# Patient Record
Sex: FEMALE | Race: OTHER
[De-identification: names, ages, dates, MRNs, and addresses within clinical notes are randomized per-mention and may not be internally consistent; named-entity substitution may affect disease eponyms.]

---

## 2019-11-08 ENCOUNTER — HOSPITAL ENCOUNTER (EMERGENCY)
Dept: HOSPITAL 56 - MW.ED | Age: 34
Discharge: HOME | End: 2019-11-08
Payer: COMMERCIAL
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
## 2019-11-08 DIAGNOSIS — Z88.2: ICD-10-CM
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
## 2019-11-08 DIAGNOSIS — S92.414A: Primary | ICD-10-CM

## 2019-11-08 PROCEDURE — 73630 X-RAY EXAM OF FOOT: CPT

## 2019-11-08 PROCEDURE — 99283 EMERGENCY DEPT VISIT LOW MDM: CPT

## 2019-11-08 NOTE — EDM.PDOC
ED HPI GENERAL MEDICAL PROBLEM





- General


Chief Complaint: Lower Extremity Injury/Pain


Stated Complaint: TOE PAIN ON RIGHT FOOT


Time Seen by Provider: 11/08/19 20:39





- History of Present Illness


INITIAL COMMENTS - FREE TEXT/NARRATIVE: 





HISTORY AND PHYSICAL:





History of present illness:


The patient is a healthy 34-year-old female who presents with complaints of 

pain at the right great toe after she fell down about 8 stairs and says that 

she bent her foot backwards. She did not pass out or black out and has no head 

neck or back pain and has no nausea or vomiting. She complains only of pain at 

the base of the great toe on the right foot and the other toes are without pain 

as is the remainder of the foot ankle leg knee and hip. Prior to these events 

she was in her usual state of good health with no systemic issues. Patient says 

she has a history of a hysterectomy and is not pregnant. She did not take any 

medications prior to coming here for the pain. The patient says that she thinks 

that she hit the left side of her face going down the stairs as there are some 

red marks there but she has no pain there





Review of systems: 


As per history of present illness and below otherwise all systems reviewed and 

negative.





Past medical history: 


As per history of present illness and as reviewed below otherwise 

noncontributory.





Surgical history: 


As per history of present illness and as reviewed below otherwise 

noncontributory.





Social history: 


No reported history of drug or alcohol abuse.





Family history: 


As per history of present illness and as reviewed below otherwise 

noncontributory.





Physical exam:


General: Well-developed well-nourished female who is nontoxic and vital signs 

are noted by me


HEENT: Atraumatic, normocephalic, there are some superficial red marks on the 

left side of the patient's face but there is no soft tissue swelling or 

palpable bony defects or deformities or tenderness of the facial bony 

architecture, negative for conjunctival pallor or scleral icterus, mucous 

membranes moist, throat clear, neck supple, nontender, trachea midline.


Lungs: Clear to auscultation, breath sounds equal bilaterally, chest nontender.


Heart: S1S2, regular rate and rhythm no overt murmurs


Abdomen: Soft, nondistended, nontender. NABS


Pelvis: Stable nontender. No lateral hip tenderness on the right


Genitourinary: Deferred.


Rectal: Deferred.


Extremities: Atraumatic, and full range of motion of all extremities with the 

exception of the right foot near the great toe. There is some minimal soft 

tissue swelling seen at the great toe and at the M TP area with exquisite 

tenderness on palpation but no ecchymosis crepitus or bony deformities nor is 

there any malalignment appreciated. The remainder of the toes are nontender as 

is the proximal metatarsals cuboids talus ankle leg knee and thigh.. 

Neurovascular unremarkable.


Neuro: Awake, alert, oriented. Cranial nerves II through XII unremarkable. 

Cerebellum unremarkable. Motor and sensory unremarkable throughout. Exam 

nonfocal.





Diagnostics:


X-ray right foot attention great toe





Therapeutics:


Motrin, ice pack, Buddy tape toes, ortho boot and crutches





Impression: 


Fracture of right great toe, proximal phalanx





Definitive disposition and diagnosis as appropriate pending reevaluation and 

review of above.


  ** Right Toe-Hailux


Pain Score (Numeric/FACES): 8





- Related Data


 Allergies











Allergy/AdvReac Type Severity Reaction Status Date / Time


 


Sulfa (Sulfonamide Allergy  Anaphylactic Verified 11/08/19 20:43





Antibiotics)   Shock  











Home Meds: 


 Home Meds





Trospium [Sanctura] 20 mg PO ACBRK 11/08/19 [History]











Review of Systems





- Review of Systems


Review Of Systems: ROS reveals no pertinent complaints other than HPI.





ED EXAM, GENERAL





- Physical Exam


Exam: See Below (See dictation)





Course





- Vital Signs


Last Recorded V/S: 


 Last Vital Signs











Temp  36.7 C   11/08/19 20:44


 


Pulse  78   11/08/19 21:09


 


Resp  16   11/08/19 21:09


 


BP  124/82   11/08/19 21:09


 


Pulse Ox  99   11/08/19 21:09














- Orders/Labs/Meds


Orders: 


 Active Orders 24 hr











 Category Date Time Status


 


 Foot Comp Min 3V Rt [CR] Stat Exams  11/08/19 20:50 Taken


 


 DME for Discharge [COMM] Stat Oth  11/08/19 21:16 Ordered











Meds: 


Medications














Discontinued Medications














Generic Name Dose Route Start Last Admin





  Trade Name Freq  PRN Reason Stop Dose Admin


 


Ibuprofen  600 mg  11/08/19 20:50  11/08/19 21:08





  Motrin  PO  11/08/19 20:51  600 mg





  ONETIME ONE   Administration





     





     





     





     














Departure





- Departure


Time of Disposition: 21:19


Disposition: Home, Self-Care 01


Condition: Good


Clinical Impression: 


Fracture of great toe of right foot


Qualifiers:


 Encounter type: initial encounter Fracture type: closed Phalanx: proximal 

Fracture alignment: nondisplaced Qualified Code(s): S92.414A - Nondisplaced 

fracture of proximal phalanx of right great toe, initial encounter for closed 

fracture








- Discharge Information


Referrals: 


Ken Meier MD [Primary Care Provider] - 


Forms:  ED Department Discharge


Additional Instructions: 


The following information is given to patients seen in the emergency department 

who are being discharged to home. This information is to outline your options 

for follow-up care. We provide all patients seen in our emergency department 

with a follow-up referral.





The need for follow-up, as well as the timing and circumstances, are variable 

depending upon the specifics of your emergency department visit.





If you don't have a primary care physician on staff, we will provide you with a 

referral. We always advise you to contact your personal physician following an 

emergency department visit to inform them of the circumstance of the visit and 

for follow-up with them and/or the need for any referrals to a consulting 

specialist.





The emergency department will also refer you to a specialist when appropriate. 

This referral assures that you have the opportunity for followup care with a 

specialist. All of these measure are taken in an effort to provide you with 

optimal care, which includes your followup.





Under all circumstances we always encourage you to contact your private 

physician who remains a resource for coordinating  your care. When calling for 

followup care, please make the office aware that this follow-up is from your 

recent emergency room visit. If for any reason you are refused follow-up, 

please contact the [facility] emergency 

department at [phone] and ask to speak to the emergency department 

charge nurse.





Dr MARILU Silva


[address] [place] [address]  [id number]


Phone:  [phone]


Fax:  [phone]








Ice and elevate the area and use over-the-counter Tylenol and ibuprofen as 

needed for pain management. You may also use a stronger pain medication you 

have been prescribed as needed for sleep and when you were at home. Do not 

weight-bear and use crutches and ortho boot at all times loosening or removing 

the ortho boot at sleep times. Please connect with our local podiatrist with 

resources given to above for further care and evaluation. Return to ER as 

needed and as discussed





- My Orders


Last 24 Hours: 


My Active Orders





11/08/19 20:50


Foot Comp Min 3V Rt [CR] Stat 





11/08/19 21:16


DME for Discharge [COMM] Stat 














- Assessment/Plan


Last 24 Hours: 


My Active Orders





11/08/19 20:50


Foot Comp Min 3V Rt [CR] Stat 





11/08/19 21:16


DME for Discharge [COMM] Stat

## 2019-11-08 NOTE — CR
INDICATION: Fall, great toe injury



TECHNIQUE: Foot radiograph 3 views right



COMPARISON: None



FINDINGS: 



Bone: There is nondisplaced oblique fracture present involving both the 

proximal and distal articular surfaces of the 1st proximal phalanx. 



Joint: The visualized hindfoot, midfoot, and forefoot joints are 

unremarkable in appearance. No significant ankle effusion is seen. 



Soft tissue: Unremarkable. No radiopaque foreign bodies are seen. 



IMPRESSION: 



1. There is nondisplaced oblique fracture present involving both the 

proximal and distal articular surfaces of the 1st proximal phalanx.



Dictated by Nhan Back MD @ 11/8/2019 9:18:16 PM



Dictated by: Nhan Back MD @ 11/08/2019 21:18:24



(Electronically Signed)

## 2019-11-20 LAB
BUN SERPL-MCNC: 13 MG/DL (ref 7–18)
CHLORIDE SERPL-SCNC: 105 MMOL/L (ref 98–107)
CO2 SERPL-SCNC: 28.7 MMOL/L (ref 21–32)
GLUCOSE SERPL-MCNC: 76 MG/DL (ref 74–106)
POTASSIUM SERPL-SCNC: 4.1 MMOL/L (ref 3.5–5.1)
SODIUM SERPL-SCNC: 140 MMOL/L (ref 136–145)

## 2019-11-21 ENCOUNTER — HOSPITAL ENCOUNTER (OUTPATIENT)
Dept: HOSPITAL 56 - MW.SDS | Age: 34
LOS: 1 days | Discharge: HOME | End: 2019-11-22
Attending: OBSTETRICS & GYNECOLOGY
Payer: COMMERCIAL

## 2019-11-21 DIAGNOSIS — D25.9: Primary | ICD-10-CM

## 2019-11-21 DIAGNOSIS — Z88.2: ICD-10-CM

## 2019-11-21 DIAGNOSIS — Z79.899: ICD-10-CM

## 2019-11-21 PROCEDURE — 86901 BLOOD TYPING SEROLOGIC RH(D): CPT

## 2019-11-21 PROCEDURE — 80048 BASIC METABOLIC PNL TOTAL CA: CPT

## 2019-11-21 PROCEDURE — 86850 RBC ANTIBODY SCREEN: CPT

## 2019-11-21 PROCEDURE — 36415 COLL VENOUS BLD VENIPUNCTURE: CPT

## 2019-11-21 PROCEDURE — 85025 COMPLETE CBC W/AUTO DIFF WBC: CPT

## 2019-11-21 PROCEDURE — 84703 CHORIONIC GONADOTROPIN ASSAY: CPT

## 2019-11-21 PROCEDURE — 85027 COMPLETE CBC AUTOMATED: CPT

## 2019-11-21 PROCEDURE — 86900 BLOOD TYPING SEROLOGIC ABO: CPT

## 2019-11-21 PROCEDURE — 58571 TLH W/T/O 250 G OR LESS: CPT

## 2019-11-21 RX ADMIN — KETOROLAC TROMETHAMINE PRN MG: 30 INJECTION, SOLUTION INTRAMUSCULAR at 22:08

## 2019-11-21 RX ADMIN — KETOROLAC TROMETHAMINE PRN MG: 30 INJECTION, SOLUTION INTRAMUSCULAR at 15:47

## 2019-11-21 NOTE — OR
SURGEON:

Herb Norton MD

 

DATE OF PROCEDURE:

 

PREOPERATIVE DIAGNOSES:

Menometrorrhagia and pelvic pain.

 

POSTOPERATIVE DIAGNOSES:

Menometrorrhagia and pelvic pain.

 

OPERATIONS PERFORMED:

Total laparoscopic hysterectomy, laparoscopic bilateral salpingectomy preserving

both ovaries, and cystoscopy.

 

PRIMARY SURGEON:

Herb Norton MD.

 

ASSISTANT:

OR tech.

 

ANESTHESIA:

General endotracheal intubation, Suzy Sierra and Dr. Martinez.

 

ESTIMATED BLOOD LOSS:

100 mL.

 

COMPLICATIONS:

None.

 

FINDINGS:

Uterus is about 10- to 11-week size.  Both ovaries essentially are normal.

 

INDICATIONS FOR SURGERY:

The reader referred to the admit note.

 

PROCEDURE IN DETAIL:

The patient was brought to the OR, properly identified.  After adequate level of

anesthesia, the patient was placed in lithotomy position, prepped and draped in

sterile fashion as usual.  Weighted speculum was placed in vagina and Randolph

catheter placed in the bladder for drainage and 3.5 size manipulator placed in

the uterus for manipulation and then the operation shifted abdominally.  Stab

wound done beneath the umbilicus.  The Veress needle was placed in the

peritoneal cavity and that cavity insufflated with 6 L of carbon dioxide, and

then the utilizing the Visiport technique, the 5 mm trocar placed

infraumbilically under direct vision.  A 10/12 trocar placed in the left iliac

fossa and 5 mm trocar in the right iliac fossa.  There were a few adhesions with

the anterior abdominal wall from her previous  section and that was

taken down by the Harmonic scalpel without any problem.  Then, the operation

started by identifying the landmark of the pelvis and then the superior pedicle

was coagulated and transected using the Ace Harmonic scalpel.  The tubes

included with the specimen.  Both ovaries preserved.  Anteriorly, the round

ligament was coagulated, done in the same way, and then the anterior leaf of the

broad ligament dissected downward medially detaching and  the bladder

completely from the operative field until the surgeon could feel the manipulator

through the vaginal wall clearly.  Then, the uterine vessel at the level of the

manipulator coagulated and transected with the Harmonic scalpel.  Next, using

the Harmonic scalpel, a circular incision in the vaginal mucosa was done

detaching the cervix from its attachment to the vagina.  The uterus, cervix,

both the ovaries were removed vaginally.  Pneumoperitoneum re-established by

placing vaginal pack in the vagina and then thorough irrigation of the pelvis

was done.  The inspection of all the pedicles was done and there were a few

areas of oozing from the vaginal wall and the side of the vagina and this picked

individually and the bleeding stopped by judicious use of the bipolar

electrocautery.  Next step, the vaginal cuff was closed laparoscopically using 2-

0 PDS interrupted sutures.  While we were doing that, we asked Anesthesia to

give the patient fluorescein and then cystoscopy was performed.  The bladder was

intact and the left ureter was visible with the dye coming from it.  The right

side, the patient is known to have a nonfunctioning right kidney, so there was

no dye visualized on the right side.  Satisfied with these findings, the

procedure ended.  The instrument and sponge count was correct.  After closing

the laparoscopic incision layer, the instrument count was correct.  The patient

tolerated the procedure well, went to recovery room in stable general condition.

 

 

SADI ELLIS

DD:  2019 11:03:33

DT:  2019 13:17:09

Job #:  [id number]

## 2019-11-21 NOTE — [id number]
Post Anesthesia Note





- EVALUATION WITHIN 48HRS OF ANESTHETIC


Vital Signs in Normal Range: Yes


Patient Participated in Evaluation: Yes


Respiratory Function Stable: Yes


Airway Patent: Yes


Cardiovascular Function Stable: Yes


Hydration Status Stable: Yes


Pain Control Satisfactory: Yes


Nausea and Vomiting Control Satisfactory: Yes


Mental Status Recovered: Yes


Vital Signs: 


 Last Vital Signs











Temp  36.0 C   11/21/19 11:00


 


Pulse  67   11/21/19 12:30


 


Resp  12   11/21/19 12:30


 


BP  111/71   11/21/19 12:30


 


Pulse Ox  94 L  11/21/19 12:30

## 2019-11-21 NOTE — PCM.PREANE
Preanesthetic Assessment





- Anesthesia/Transfusion/Family Hx


Anesthesia History: Prior Anesthesia Reaction


Other Type of Anesthesia Reaction Comment: only with first - was 

"treated" after that


Family History of Anesthesia Reaction: No


Transfusion History: No Prior Transfusion(s)


Intubation History: Unknown





- Review of Systems


General: No Symptoms


Pulmonary: No Symptoms


Cardiovascular: No Symptoms


Gastrointestinal: No Symptoms


Neurological: No Symptoms


Other: Reports: None





- Physical Assessment


Vital Signs: 





 Last Vital Signs











Temp  36.4 C   19 07:04


 


Pulse  77   19 07:04


 


Resp  14   19 07:04


 


BP  105/68   19 07:04


 


Pulse Ox  98   19 07:04











Height: 5 ft 2 in


Weight: 71.668 kg


ASA Class: 2


Mental Status: Alert & Oriented x3


Airway Class: Mallampati = 1


Dentition: Reports: Normal Dentition (braces upper and lower), Crown(s) (x1 

upper front)


Thyro-Mental Finger Breadths: 3


Mouth Opening Finger Breadths: 3


ROM/Head Extension: Full


Lungs: Clear to Auscultation, Normal Respiratory Effort


Cardiovascular: Regular Rate, Regular Rhythm





- Lab


Values: 





 Laboratory Last Values











WBC  5.46 K/uL (4.0-11.0)   19  09:40    


 


RBC  4.18 M/uL (4.30-5.90)  L  19  09:40    


 


Hgb  12.4 g/dL (12.0-16.0)   19  09:40    


 


Hct  36.9 % (36.0-46.0)   19  09:40    


 


MCV  88.3 fL (80.0-98.0)   19  09:40    


 


MCH  29.7 pg (27.0-32.0)   19  09:40    


 


MCHC  33.6 g/dL (31.0-37.0)   19  09:40    


 


RDW Std Deviation  40.2 fl (28.0-62.0)   19  09:40    


 


RDW Coeff of Samantha  13 % (11.0-15.0)   19  09:40    


 


Plt Count  290 K/uL (150-400)   19  09:40    


 


MPV  9.50 fL (7.40-12.00)   19  09:40    


 


Nucleated RBC %  0.0 /100WBC  19  09:40    


 


Nucleated RBCs #  0 K/uL  19  09:40    


 


Sodium  140 mmol/L (136-145)   19  09:40    


 


Potassium  4.1 mmol/L (3.5-5.1)   19  09:40    


 


Chloride  105 mmol/L ()   19  09:40    


 


Carbon Dioxide  28.7 mmol/L (21.0-32.0)   19  09:40    


 


BUN  13 mg/dL (7.0-18.0)   19  09:40    


 


Creatinine  0.6 mg/dL (0.6-1.0)   19  09:40    


 


Est Cr Clr Drug Dosing  105.48 mL/min  19  09:40    


 


Estimated GFR (MDRD)  > 60.0 ml/min  19  09:40    


 


Glucose  76 mg/dL ()   19  09:40    


 


Calcium  8.4 mg/dL (8.5-10.1)  L  19  09:40    


 


HCG, Qual  NEGATIVE  (NEG)   19  09:40    


 


Blood Type  O POSITIVE   19  09:40    


 


Antibody Screen  NEGATIVE   19  09:40    














- Allergies


Allergies/Adverse Reactions: 


 Allergies











Allergy/AdvReac Type Severity Reaction Status Date / Time


 


Sulfa (Sulfonamide Allergy  Anaphylactic Verified 19 07:10





Antibiotics)   Shock  














- Blood


Blood Available: No





- Anesthesia Plan


Pre-Op Medication Ordered: None





- Acknowledgements


Anesthesia Type Planned: General Anesthesia


Pt an Appropriate Candidate for the Planned Anesthesia: Yes


Alternatives and Risks of Anesthesia Discussed w Pt/Guardian: Yes


Pt/Guardian Understands and Agrees with Anesthesia Plan: Yes





PreAnesthesia Questionnaire


HEENT History: Reports: Other (See Below)


Other HEENT History: has upper and lower dental braces


Genitourinary History: Reports: Other (See Below)


Other Genitourinary History: has only 1 kidney- congenital


OB/GYN History: Reports: Pregnancy, Other (See Below) (cystocele)


Musculoskeletal History: Reports: Fracture


Other Musculoskeletal History: currently has fx right toe and right sprained 

ankle- is wearing a "boot"


Neurological History: Reports: Other (See Below)


Other Neuro History: hx of motion sickness


Psychiatric History: Reports: Anxiety


Other Psychiatric History: states "mild" anxiety- no medication


Hematologic History: Reports: Anemia





- Infectious Disease History


Infectious Disease History: Reports: None





- Past Surgical History


Head Surgeries/Procedures: Reports: None


HEENT Surgical History: Reports: Tonsillectomy


GI Surgical History: Reports: Bariatric Procedure


Female  Surgical History: Reports:  Section, LEEP


Other Female  Surgeries/Procedures:  x3





- SUBSTANCE USE


Smoking Status *Q: Never Smoker


Recreational Drug Use History: No





- HOME MEDS


Home Medications: 


 Home Meds





Calcium Carb, Citrate/Vit D3 [Citracal + D ER] 1 tab PO DAILY 19 [History]


Desogestrel-Ethinyl Estradiol [Juleber 28 Day Tablet] 1 tab PO DAILY 19 [

History]


Iron 65 mg PO BID 19 [History]


Multivit/Iron/Folic Acid/Hb179 [Womens Multivit Hi Potency Tab] 1 tab PO DAILY 

19 [History]











- CURRENT (IN HOUSE) MEDS


Current Meds: 





 Current Medications





Lactated Ringer's (Ringers, Lactated)  1,000 mls @ 125 mls/hr IV ASDIRECTED SHAWNEE


   Last Admin: 19 07:21 Dose:  125 mls/hr


Sodium Chloride (Saline Flush)  10 ml FLUSH ASDIRECTED PRN


   PRN Reason: Keep Vein Open


Sodium Chloride (Saline Flush)  2.5 ml FLUSH ASDIRECTED PRN


   PRN Reason: Keep Vein Open


Sodium Chloride (Normal Saline)  10 ml IV ASDIRECTED PRN


   PRN Reason: IV Use





Discontinued Medications





Cefazolin Sodium/Dextrose (Ancef) Confirm Administered Dose 2 gm IV .STK-MED ONE


   Stop: 19 07:24


Fentanyl (Sublimaze) Confirm Administered Dose 100 mcg .ROUTE .STK-MED ONE


   Stop: 19 07:20


Cefazolin Sodium/Dextrose 2 gm (/ Premix)  50 mls @ 100 mls/hr IV ONETIME ONE


   Stop: 19 09:28


Lidocaine (Xylocaine-Mpf 2%) Confirm Administered Dose 5 ml .ROUTE .STK-MED ONE


   Stop: 19 07:20


Midazolam HCl (Versed 1 Mg/Ml) Confirm Administered Dose 2 mg .ROUTE .STK-MED 

ONE


   Stop: 19 07:20


Propofol (Diprivan  20 Ml) Confirm Administered Dose 200 mg .ROUTE .STK-MED ONE


   Stop: 19 07:20


Rocuronium Bromide (Zemuron) Confirm Administered Dose 100 mg .ROUTE .STK-MED 

ONE


   Stop: 19 07:21

## 2019-11-21 NOTE — PCM.DCSUM1
**Discharge Summary





- Hospital Course


Diagnosis: Stroke: No





- Discharge Data


Discharge Date: 11/21/19


Discharge Disposition: Home, Self-Care 01


Condition: Good





- Referral to Home Health


Primary Care Physician: 


Ken Meier MD








- Patient Summary/Data


Operative Procedure(s) Performed: rama ALVAREZ sa.  engectomy and Cystoscopy.





- Discharge Plan


Home Medications: 


 Home Meds





Calcium Carb, Citrate/Vit D3 [Citracal + D ER] 1 tab PO DAILY 11/18/19 [History]


Desogestrel-Ethinyl Estradiol [Juleber 28 Day Tablet] 1 tab PO DAILY 11/18/19 [

History]


Iron 65 mg PO BID 11/18/19 [History]


Multivit/Iron/Folic Acid/Hb179 [Womens Multivit Hi Potency Tab] 1 tab PO DAILY 

11/18/19 [History]











- Discharge Summary/Plan Comment


DC Time >30 min.: Yes





- General Info


Date of Service: 11/21/19


Functional Status: Reports: Pain Controlled





- Review of Systems


General: Reports: No Symptoms


HEENT: Reports: No Symptoms


Pulmonary: Reports: No Symptoms


Cardiovascular: Reports: No Symptoms


Gastrointestinal: Reports: No Symptoms


Genitourinary: Reports: No Symptoms


Musculoskeletal: Reports: No Symptoms


Skin: Reports: No Symptoms


Neurological: Reports: No Symptoms


Psychiatric: Reports: No Symptoms





- Patient Data


Vitals - Most Recent: 


 Last Vital Signs











Temp  36 C   11/21/19 10:22


 


Pulse  83   11/21/19 10:47


 


Resp  10 L  11/21/19 10:47


 


BP  114/78   11/21/19 10:47


 


Pulse Ox  99   11/21/19 10:47











Weight - Most Recent: 71.668 kg


I&O - Last 24 hours: 


 Intake & Output











 11/20/19 11/21/19 11/21/19





 22:59 06:59 14:59


 


Output Total   400


 


Balance   -400











Med Orders - Current: 


 Current Medications





Belladonna Alkaloids/Opium (B & O Supprettes No. 15a)  1 supp RECTAL ONETIME PRN


   PRN Reason: Pain


Fentanyl (Sublimaze)  50 - 100 mcg IVPUSH Q5M PRN


   PRN Reason: Pain


   Stop: 11/21/19 11:47


   Last Admin: 11/21/19 10:45 Dose:  50 mcg


Lactated Ringer's (Ringers, Lactated)  1,000 mls @ 125 mls/hr IV ASDIRECTED [place]


   Last Admin: 11/21/19 07:21 Dose:  125 mls/hr


Ketorolac Tromethamine (Toradol)  30 mg IVPUSH Q6H PRN


   PRN Reason: Pain (severe 7-10)


   Stop: 11/26/19 10:10


Meperidine HCl (Demerol)  12.5 - 25 mg IV ONETIME PRN


   PRN Reason: Shivering


   Stop: 11/21/19 11:46


Morphine Sulfate (Morphine)  4 mg IVPUSH Q2H PRN


   PRN Reason: Pain (severe 7-10)


Ondansetron HCl (Zofran)  4 mg IVPUSH Q6H PRN


   PRN Reason: Nausea/Vomiting


Oxycodone/Acetaminophen (Percocet 325-5 Mg)  1 tab PO Q4H PRN


   PRN Reason: Pain (moderate 4-6)


Oxycodone/Acetaminophen (Percocet 325-5 Mg)  2 tab PO Q4H PRN


   PRN Reason: Pain (moderate 4-6)


Promethazine HCl (Phenergan)  12.5 mg IM ONETIME PRN


   PRN Reason: Nausea


Promethazine HCl (Phenergan)  25 mg IM Q6H PRN


   PRN Reason: Nausea/Vomiting


Scopolamine (Transderm-Scop)  1.5 mg TRDERM Q72H PRN


   PRN Reason: Nausea


   Last Admin: 11/21/19 07:37 Dose:  1.5 mg


Sodium Chloride (Saline Flush)  10 ml FLUSH ASDIRECTED PRN


   PRN Reason: Keep Vein Open


Sodium Chloride (Saline Flush)  2.5 ml FLUSH ASDIRECTED PRN


   PRN Reason: Keep Vein Open


Sodium Chloride (Normal Saline)  10 ml IV ASDIRECTED PRN


   PRN Reason: IV Use





Discontinued Medications





Cefazolin Sodium/Dextrose (Ancef) Confirm Administered Dose 2 gm IV .STK-MED ONE


   Stop: 11/21/19 07:24


Dexamethasone (Dexamethasone) Confirm Administered Dose 20 mg .ROUTE .STK-MED 

ONE


   Stop: 11/21/19 08:23


Fentanyl (Sublimaze) Confirm Administered Dose 100 mcg .ROUTE .STK-MED ONE


   Stop: 11/21/19 07:20


Fluorescein Sodium (Ak-Fluor) Confirm Administered Dose 5 ml .ROUTE .STK-MED ONE


   Stop: 11/21/19 07:33


Glycopyrrolate (Robinul) Confirm Administered Dose 0.4 mg .ROUTE .STK-MED ONE


   Stop: 11/21/19 09:11


Hydromorphone HCl (Dilaudid) Confirm Administered Dose 2 mg .ROUTE .STK-MED ONE


   Stop: 11/21/19 08:26


Cefazolin Sodium/Dextrose 2 gm (/ Premix)  50 mls @ 100 mls/hr IV ONETIME ONE


   Stop: 11/20/19 09:28


Ketorolac Tromethamine (Toradol) Confirm Administered Dose 30 mg .ROUTE .STK-

MED ONE


   Stop: 11/21/19 10:04


Ketorolac Tromethamine (Toradol)  30 mg IVPUSH ONETIME ONE


   Stop: 11/21/19 10:11


Lidocaine (Xylocaine-Mpf 2%) Confirm Administered Dose 5 ml .ROUTE .STK-MED ONE


   Stop: 11/21/19 07:20


Metoclopramide HCl (Reglan) Confirm Administered Dose 10 mg .ROUTE .STK-MED ONE


   Stop: 11/21/19 08:23


Midazolam HCl (Versed 1 Mg/Ml) Confirm Administered Dose 2 mg .ROUTE .STK-MED 

ONE


   Stop: 11/21/19 07:20


Neostigmine Methylsulfate (Neostigmine) Confirm Administered Dose 5 mg .ROUTE 

.STK-MED ONE


   Stop: 11/21/19 09:11


Octyl Cyanoacrylate (Dermabond Advance) Confirm Administered Dose 1 applic 

.ROUTE .STK-MED ONE


   Stop: 11/21/19 07:33


Ondansetron HCl (Zofran) Confirm Administered Dose 4 mg .ROUTE .STK-MED ONE


   Stop: 11/21/19 08:23


Propofol (Diprivan  20 Ml) Confirm Administered Dose 200 mg .ROUTE .STK-MED ONE


   Stop: 11/21/19 07:20


Rocuronium Bromide (Zemuron) Confirm Administered Dose 100 mg .ROUTE .STK-MED 

ONE


   Stop: 11/21/19 07:21


Scopolamine (Transderm-Scop) Confirm Administered Dose 1.5 mg .ROUTE .STK-MED 

ONE


   Stop: 11/21/19 07:34











- Exam


General: Reports: Alert, Oriented


HEENT: Reports: Pupils Equal, Pupils Reactive, EOMI, Mucous Membr. Moist/Pink


Neck: Reports: Supple


Lungs: Reports: Clear to Auscultation, Normal Respiratory Effort


Cardiovascular: Reports: Regular Rate, Regular Rhythm


GI/Abdominal Exam: Normal Bowel Sounds, Soft, Non-Tender, No Organomegaly, No 

Distention, No Abnormal Bruit, No Mass, Pelvis Stable


 (Female) Exam: Normal External Exam, Normal Speculum Exam, Normal Bimanual 

Exam


Rectal (Female) Exam: Normal Exam, Normal Rectal Tone


Back Exam: Reports: Normal Inspection, Full Range of Motion


Extremities: Normal Inspection, Normal Range of Motion, Non-Tender, No Pedal 

Edema, Normal Capillary Refill


Skin: Reports: Warm, Dry, Intact


Wound/Incisions: Reports: Healing Well


Neurological: Reports: No New Focal Deficit


Psy/Mental Status: Reports: Alert, Normal Affect, Normal Mood

## 2019-11-21 NOTE — PCM.OPNOTE
- General Post-Op/Procedure Note


Date of Surgery/Procedure: 11/21/19


Operative Procedure(s): TLH,blacharles sa.  engectomy and Cystoscopy.


Pre Op Diagnosis: bleeding


Post-Op Diagnosis: Same


Anesthesia Technique: General ET Tube


Primary Surgeon: Herb Norton


EBL in mLs: 100


Complications: None


Condition: Good

## 2019-11-21 NOTE — PCM.POSTAN
POST ANESTHESIA ASSESSMENT





- MENTAL STATUS


Mental Status: Alert, Oriented





- VITAL SIGNS


Vital Signs: 


 Last Vital Signs











Temp  36 C   11/21/19 10:22


 


Pulse  83   11/21/19 10:47


 


Resp  10 L  11/21/19 10:47


 


BP  114/78   11/21/19 10:47


 


Pulse Ox  99   11/21/19 10:47














- RESPIRATORY


Respiratory Status: Respiratory Rate WNL, Airway Patent, O2 Saturation Stable





- CARDIOVASCULAR


CV Status: Pulse Rate WNL, Blood Pressure Stable





- GASTROINTESTINAL


GI Status: No Symptoms





- PAIN


Pain Score: 3





- POST OP HYDRATION


Hydration Status: Adequate & Stable

## 2019-11-21 NOTE — PCM.PNPP
- General Info


Date of Service: 19


Functional Status: Reports: Pain Controlled





- Review of Systems


General: Reports: No Symptoms


HEENT: Reports: No Symptoms


Pulmonary: Reports: No Symptoms


Cardiovascular: Reports: No Symptoms


Gastrointestinal: Reports: No Symptoms


Genitourinary: Reports: No Symptoms


Musculoskeletal: Reports: No Symptoms


Skin: Reports: No Symptoms


Neurological: Reports: No Symptoms


Psychiatric: Reports: No Symptoms





- General Info


Date of Service: 19





- Patient Data


Vital Signs - Most Recent: 


 Last Vital Signs











Temp  36 C   19 10:22


 


Pulse  89   19 10:37


 


Resp  10 L  19 10:37


 


BP  117/61   19 10:37


 


Pulse Ox  100   19 10:37











Weight - Most Recent: 71.668 kg


I&O - Last 24 Hours: 


 Intake & Output











 19





 22:59 06:59 14:59


 


Output Total   400


 


Balance   -400











Med Orders - Current: 


 Current Medications





Belladonna Alkaloids/Opium (B & O Supprettes No. 15a)  1 supp RECTAL ONETIME PRN


   PRN Reason: Pain


Fentanyl (Sublimaze)  50 - 100 mcg IVPUSH Q5M PRN


   PRN Reason: Pain


   Stop: 19 11:47


   Last Admin: 19 10:45 Dose:  50 mcg


Lactated Ringer's (Ringers, Lactated)  1,000 mls @ 125 mls/hr IV ASDIRECTED [facility]


   Last Admin: 19 07:21 Dose:  125 mls/hr


Ketorolac Tromethamine (Toradol)  30 mg IVPUSH Q6H PRN


   PRN Reason: Pain (severe 7-10)


   Stop: 19 10:10


Meperidine HCl (Demerol)  12.5 - 25 mg IV ONETIME PRN


   PRN Reason: Shivering


   Stop: 19 11:46


Morphine Sulfate (Morphine)  4 mg IVPUSH Q2H PRN


   PRN Reason: Pain (severe 7-10)


Ondansetron HCl (Zofran)  4 mg IVPUSH Q6H PRN


   PRN Reason: Nausea/Vomiting


Oxycodone/Acetaminophen (Percocet 325-5 Mg)  1 tab PO Q4H PRN


   PRN Reason: Pain (moderate 4-6)


Oxycodone/Acetaminophen (Percocet 325-5 Mg)  2 tab PO Q4H PRN


   PRN Reason: Pain (moderate 4-6)


Promethazine HCl (Phenergan)  12.5 mg IM ONETIME PRN


   PRN Reason: Nausea


Promethazine HCl (Phenergan)  25 mg IM Q6H PRN


   PRN Reason: Nausea/Vomiting


Scopolamine (Transderm-Scop)  1.5 mg TRDERM Q72H PRN


   PRN Reason: Nausea


   Last Admin: 19 07:37 Dose:  1.5 mg


Sodium Chloride (Saline Flush)  10 ml FLUSH ASDIRECTED PRN


   PRN Reason: Keep Vein Open


Sodium Chloride (Saline Flush)  2.5 ml FLUSH ASDIRECTED PRN


   PRN Reason: Keep Vein Open


Sodium Chloride (Normal Saline)  10 ml IV ASDIRECTED PRN


   PRN Reason: IV Use





Discontinued Medications





Cefazolin Sodium/Dextrose (Ancef) Confirm Administered Dose 2 gm IV .STK-MED ONE


   Stop: 19 07:24


Dexamethasone (Dexamethasone) Confirm Administered Dose 20 mg .ROUTE .STK-MED 

ONE


   Stop: 19 08:23


Fentanyl (Sublimaze) Confirm Administered Dose 100 mcg .ROUTE .STK-MED ONE


   Stop: 19 07:20


Fluorescein Sodium (Ak-Fluor) Confirm Administered Dose 5 ml .ROUTE .STK-MED ONE


   Stop: 19 07:33


Glycopyrrolate (Robinul) Confirm Administered Dose 0.4 mg .ROUTE .STK-MED ONE


   Stop: 19 09:11


Hydromorphone HCl (Dilaudid) Confirm Administered Dose 2 mg .ROUTE .STK-MED ONE


   Stop: 19 08:26


Cefazolin Sodium/Dextrose 2 gm (/ Premix)  50 mls @ 100 mls/hr IV ONETIME ONE


   Stop: 19 09:28


Ketorolac Tromethamine (Toradol) Confirm Administered Dose 30 mg .ROUTE .STK-

MED ONE


   Stop: 19 10:04


Ketorolac Tromethamine (Toradol)  30 mg IVPUSH ONETIME ONE


   Stop: 19 10:11


Lidocaine (Xylocaine-Mpf 2%) Confirm Administered Dose 5 ml .ROUTE .STK-MED ONE


   Stop: 19 07:20


Metoclopramide HCl (Reglan) Confirm Administered Dose 10 mg .ROUTE .STK-MED ONE


   Stop: 19 08:23


Midazolam HCl (Versed 1 Mg/Ml) Confirm Administered Dose 2 mg .ROUTE .STK-MED 

ONE


   Stop: 19 07:20


Neostigmine Methylsulfate (Neostigmine) Confirm Administered Dose 5 mg .ROUTE 

.STK-MED ONE


   Stop: 19 09:11


Octyl Cyanoacrylate (Dermabond Advance) Confirm Administered Dose 1 applic 

.ROUTE .STK-MED ONE


   Stop: 19 07:33


Ondansetron HCl (Zofran) Confirm Administered Dose 4 mg .ROUTE .STK-MED ONE


   Stop: 19 08:23


Propofol (Diprivan  20 Ml) Confirm Administered Dose 200 mg .ROUTE .STK-MED ONE


   Stop: 19 07:20


Rocuronium Bromide (Zemuron) Confirm Administered Dose 100 mg .ROUTE .STK-MED 

ONE


   Stop: 19 07:21


Scopolamine (Transderm-Scop) Confirm Administered Dose 1.5 mg .ROUTE .STK-MED 

ONE


   Stop: 19 07:34











- Infant Interaction


Infant Disposition, Postpartum:  in Room with Family


Infant Interaction: Holding Infant


Infant Feeding: Attempted Breastfeeding; Nursed Fair/Poor





- Exam


General: Alert, Oriented


HEENT: Pupils Equal


Neck: Supple


Lungs: Clear to Auscultation, Normal Respiratory Effort


Cardiovascular: Regular Rate, Regular Rhythm


GI/Abdominal Exam: Normal Bowel Sounds, Soft, Non-Tender, No Organomegaly, No 

Distention, No Abnormal Bruit, No Mass, Pelvis Stable


Extremities: Normal Inspection, Normal Range of Motion, Non-Tender, No Pedal 

Edema, Normal Capillary Refill


Skin: Warm, Dry, Intact


Wound/Incisions: Healing Well


Neurological: No New Focal Deficit


Psy/Mental Status: Alert, Normal Affect, Normal Mood





- Problem List Review


Problem List Initiated/Reviewed/Updated: Yes





- My Orders


Last 24 Hours: 


My Active Orders





19 Dinner


Nothing per Oral Now Diet [DIET] 





19 10:10


Patient Status [ADT] Routine 


Notify Provider Vital Signs [RC] ASDIRECTED 


Oxygen Therapy [RC] ASDIRECTED 


RT Incentive Spirometry [RC] Q2HWA 


Up With Assistance [RC] PER UNIT ROUTINE 


Up ad Cristin [RC] PER UNIT ROUTINE 


Urinary Catheter Removal [RC] Per Unit Routine 


Vital Signs [RC] PER UNIT ROUTINE 


Acetaminophen/oxyCODONE [Percocet 325-5 MG]   1 tab PO Q4H PRN 


Acetaminophen/oxyCODONE [Percocet 325-5 MG]   2 tab PO Q4H PRN 


Ketorolac [Toradol]   30 mg IVPUSH Q6H PRN 


Morphine   4 mg IVPUSH Q2H PRN 


Ondansetron [Zofran]   4 mg IVPUSH Q6H PRN 


Promethazine [Phenergan]   25 mg IM Q6H PRN 


Peripheral IV Discontinue [OM.PC] Routine 


Sequential Compression Device [OM.PC] Per Unit Routine 


Resuscitation Status Routine 





19 10:11


Antiembolic Devices [RC] PER UNIT ROUTINE 





19 Lunch


Regular Diet [DIET] 





19 05:11


BASIC METABOLIC PANEL,BMP [CHEM] AM 


CBC WITH AUTO DIFF [HEME] AM 














- Assessment


Assessment:: 





S/P  doing well.





- Plan


Plan:: 





send home today.

## 2019-11-22 LAB
BUN SERPL-MCNC: 14 MG/DL (ref 7–18)
CHLORIDE SERPL-SCNC: 106 MMOL/L (ref 98–107)
CO2 SERPL-SCNC: 26.4 MMOL/L (ref 21–32)
GLUCOSE SERPL-MCNC: 89 MG/DL (ref 74–106)
POTASSIUM SERPL-SCNC: 3.8 MMOL/L (ref 3.5–5.1)
SODIUM SERPL-SCNC: 139 MMOL/L (ref 136–145)

## 2019-11-22 RX ADMIN — KETOROLAC TROMETHAMINE PRN MG: 30 INJECTION, SOLUTION INTRAMUSCULAR at 07:27

## 2019-11-22 NOTE — [id number]
Post Anesthesia Note





- EVALUATION WITHIN 48HRS OF ANESTHETIC


Vital Signs in Normal Range: Yes


Patient Participated in Evaluation: Yes


Respiratory Function Stable: Yes


Airway Patent: Yes


Cardiovascular Function Stable: Yes


Hydration Status Stable: Yes


Pain Control Satisfactory: Yes (only having pain with getting out of bed)


Nausea and Vomiting Control Satisfactory: Yes


Mental Status Recovered: Yes


Vital Signs: 


 Last Vital Signs











Temp  37.1 C   11/22/19 07:30


 


Pulse  64   11/22/19 07:30


 


Resp  18   11/22/19 07:30


 


BP  101/62   11/22/19 07:30


 


Pulse Ox  99   11/22/19 07:30

## 2019-11-22 NOTE — PCM.SURGPN
- General Info


Date of Service: 11/22/19


POD#: 1


Functional Status: Reports: Pain Controlled





- Review of Systems


General: Reports: No Symptoms


HEENT: Reports: No Symptoms


Pulmonary: Reports: No Symptoms


Cardiovascular: Reports: No Symptoms


Gastrointestinal: Reports: No Symptoms


Genitourinary: Reports: No Symptoms


Musculoskeletal: Reports: No Symptoms


Skin: Reports: No Symptoms


Neurological: Reports: No Symptoms


Psychiatric: Reports: No Symptoms





- Patient Data


Vitals - Most Recent: 


 Last Vital Signs











Temp  37.1 C   11/22/19 07:30


 


Pulse  64   11/22/19 07:30


 


Resp  18   11/22/19 07:30


 


BP  101/62   11/22/19 07:30


 


Pulse Ox  99   11/22/19 07:30











Weight - Most Recent: 71.668 kg


I&O - Last 24 Hours: 


 Intake & Output











 11/21/19 11/22/19 11/22/19





 22:59 06:59 14:59


 


Intake Total 780 2200 


 


Output Total 400 400 


 


Balance 380 1800 











Lab Results Last 24 Hrs: 


 Laboratory Results - last 24 hr











  11/22/19 11/22/19 Range/Units





  05:37 05:37 


 


WBC  9.29   (4.0-11.0)  K/uL


 


RBC  3.55 L   (4.30-5.90)  M/uL


 


Hgb  10.2 L   (12.0-16.0)  g/dL


 


Hct  30.9 L   (36.0-46.0)  %


 


MCV  87.0   (80.0-98.0)  fL


 


MCH  28.7   (27.0-32.0)  pg


 


MCHC  33.0   (31.0-37.0)  g/dL


 


RDW Std Deviation  40.0   (28.0-62.0)  fl


 


RDW Coeff of Samantha  12   (11.0-15.0)  %


 


Plt Count  270   (150-400)  K/uL


 


MPV  9.40   (7.40-12.00)  fL


 


Neut % (Auto)  61.8   (48.0-80.0)  %


 


Lymph % (Auto)  28.5   (16.0-40.0)  %


 


Mono % (Auto)  8.1   (0.0-15.0)  %


 


Eos % (Auto)  1.2   (0.0-7.0)  %


 


Baso % (Auto)  0.4   (0.0-1.5)  %


 


Neut # (Auto)  5.7   (1.4-5.7)  K/uL


 


Lymph # (Auto)  2.7 H   (0.6-2.4)  K/uL


 


Mono # (Auto)  0.8   (0.0-0.8)  K/uL


 


Eos # (Auto)  0.1   (0.0-0.7)  K/uL


 


Baso # (Auto)  0.0   (0.0-0.1)  K/uL


 


Nucleated RBC %  0.0   /100WBC


 


Nucleated RBCs #  0   K/uL


 


Sodium   139  (136-145)  mmol/L


 


Potassium   3.8  (3.5-5.1)  mmol/L


 


Chloride   106  ()  mmol/L


 


Carbon Dioxide   26.4  (21.0-32.0)  mmol/L


 


BUN   14  (7.0-18.0)  mg/dL


 


Creatinine   0.7  (0.6-1.0)  mg/dL


 


Est Cr Clr Drug Dosing   90.41  mL/min


 


Estimated GFR (MDRD)   > 60.0  ml/min


 


Glucose   89  ()  mg/dL


 


Calcium   8.4 L  (8.5-10.1)  mg/dL











Med Orders - Current: 


 Current Medications





Belladonna Alkaloids/Opium (B & O Supprettes No. 15a)  1 supp RECTAL ONETIME PRN


   PRN Reason: Pain


   Last Admin: 11/21/19 16:13 Dose:  1 supp


Lactated Ringer's (Ringers, Lactated)  1,000 mls @ 125 mls/hr IV ASDIRECTED [facility]


   Last Admin: 11/21/19 11:36 Dose:  125 mls/hr


Ketorolac Tromethamine (Toradol)  30 mg IVPUSH Q6H PRN


   PRN Reason: Pain (severe 7-10)


   Stop: 11/26/19 10:10


   Last Admin: 11/22/19 07:27 Dose:  30 mg


Morphine Sulfate (Morphine)  4 mg IVPUSH Q2H PRN


   PRN Reason: Pain (severe 7-10)


Ondansetron HCl (Zofran)  4 mg IVPUSH Q6H PRN


   PRN Reason: Nausea/Vomiting


   Last Admin: 11/21/19 15:48 Dose:  4 mg


Oxycodone/Acetaminophen (Percocet 325-5 Mg)  1 tab PO Q4H PRN


   PRN Reason: Pain (moderate 4-6)


Oxycodone/Acetaminophen (Percocet 325-5 Mg)  2 tab PO Q4H PRN


   PRN Reason: Pain (moderate 4-6)


Promethazine HCl (Phenergan)  25 mg IM Q6H PRN


   PRN Reason: Nausea/Vomiting


Scopolamine (Transderm-Scop)  1.5 mg TRDERM Q72H PRN


   PRN Reason: Nausea


   Last Admin: 11/21/19 07:37 Dose:  1.5 mg


Sodium Chloride (Saline Flush)  10 ml FLUSH ASDIRECTED PRN


   PRN Reason: Keep Vein Open


Sodium Chloride (Saline Flush)  2.5 ml FLUSH ASDIRECTED PRN


   PRN Reason: Keep Vein Open


Sodium Chloride (Normal Saline)  10 ml IV ASDIRECTED PRN


   PRN Reason: IV Use





Discontinued Medications





Cefazolin Sodium/Dextrose (Ancef) Confirm Administered Dose 2 gm IV .STK-MED ONE


   Stop: 11/21/19 07:24


Dexamethasone (Dexamethasone) Confirm Administered Dose 20 mg .ROUTE .STK-MED 

ONE


   Stop: 11/21/19 08:23


Fentanyl (Sublimaze) Confirm Administered Dose 100 mcg .ROUTE .STK-MED ONE


   Stop: 11/21/19 07:20


Fentanyl (Sublimaze)  50 - 100 mcg IVPUSH Q5M PRN


   PRN Reason: Pain


   Stop: 11/21/19 11:47


   Last Admin: 11/21/19 10:45 Dose:  50 mcg


Fluorescein Sodium (Ak-Fluor) Confirm Administered Dose 5 ml .ROUTE .STK-MED ONE


   Stop: 11/21/19 07:33


Glycopyrrolate (Robinul) Confirm Administered Dose 0.4 mg .ROUTE .STK-MED ONE


   Stop: 11/21/19 09:11


Hydromorphone HCl (Dilaudid) Confirm Administered Dose 2 mg .ROUTE .STK-MED ONE


   Stop: 11/21/19 08:26


Cefazolin Sodium/Dextrose 2 gm (/ Premix)  50 mls @ 100 mls/hr IV ONETIME ONE


   Stop: 11/20/19 09:28


   Last Admin: 11/21/19 11:04 Dose:  Not Given


Ketorolac Tromethamine (Toradol) Confirm Administered Dose 30 mg .ROUTE .STK-

MED ONE


   Stop: 11/21/19 10:04


Ketorolac Tromethamine (Toradol)  30 mg IVPUSH ONETIME ONE


   Stop: 11/21/19 10:11


   Last Admin: 11/21/19 11:19 Dose:  Not Given


Lidocaine (Xylocaine-Mpf 2%) Confirm Administered Dose 5 ml .ROUTE .STK-MED ONE


   Stop: 11/21/19 07:20


Meperidine HCl (Demerol)  12.5 - 25 mg IV ONETIME PRN


   PRN Reason: Shivering


   Stop: 11/21/19 11:46


Metoclopramide HCl (Reglan) Confirm Administered Dose 10 mg .ROUTE .STK-MED ONE


   Stop: 11/21/19 08:23


Midazolam HCl (Versed 1 Mg/Ml) Confirm Administered Dose 2 mg .ROUTE .STK-MED 

ONE


   Stop: 11/21/19 07:20


Neostigmine Methylsulfate (Neostigmine) Confirm Administered Dose 5 mg .ROUTE 

.STK-MED ONE


   Stop: 11/21/19 09:11


Octyl Cyanoacrylate (Dermabond Advance) Confirm Administered Dose 1 applic 

.ROUTE .STK-MED ONE


   Stop: 11/21/19 07:33


Ondansetron HCl (Zofran) Confirm Administered Dose 4 mg .ROUTE .STK-MED ONE


   Stop: 11/21/19 08:23


Promethazine HCl (Phenergan)  12.5 mg IM ONETIME PRN


   PRN Reason: Nausea


Propofol (Diprivan  20 Ml) Confirm Administered Dose 200 mg .ROUTE .STK-MED ONE


   Stop: 11/21/19 07:20


Rocuronium Bromide (Zemuron) Confirm Administered Dose 100 mg .ROUTE .STK-MED 

ONE


   Stop: 11/21/19 07:21


Scopolamine (Transderm-Scop) Confirm Administered Dose 1.5 mg .ROUTE .STK-MED 

ONE


   Stop: 11/21/19 07:34


   Last Admin: 11/21/19 11:04 Dose:  Not Given











- Exam


Wound/Incisions: Healing Well


General: Alert, Oriented


HEENT: Pupils Equal


Neck: Supple


Lungs: Clear to Auscultation, Normal Respiratory Effort


Cardiovascular: Regular Rate, Regular Rhythm


GI/Abdominal Exam: Normal Bowel Sounds, Soft, Non-Tender, No Organomegaly, No 

Distention, No Abnormal Bruit, No Mass, Pelvis Stable


Extremities: Normal Inspection, Normal Range of Motion, Non-Tender, No Pedal 

Edema, Normal Capillary Refill


Skin: Warm, Dry, Intact


Neurological: No New Focal Deficit


Psy/Mental Status: Alert, Normal Affect, Normal Mood





- Problem List Review


Problem List Initiated/Reviewed/Updated: Yes





- My Orders


Last 24 Hours: 


 Active Orders 24 hr











 Category Date Time Status


 


 Patient Status [ADT] Routine ADT  11/21/19 10:10 Active


 


 Antiembolic Devices [RC] PER UNIT ROUTINE Care  11/21/19 10:11 Active


 


 Notify Provider Vital Signs [RC] ASDIRECTED Care  11/21/19 10:10 Active


 


 Oxygen Therapy [RC] ASDIRECTED Care  11/21/19 10:10 Active


 


 RT Incentive Spirometry [RC] Q2HWA Care  11/21/19 10:10 Active


 


 Ready for Discharge [RC] PER UNIT ROUTINE Care  11/21/19 10:50 Active


 


 Up With Assistance [RC] PER UNIT ROUTINE Care  11/21/19 10:10 Active


 


 Up ad Cristin [RC] PER UNIT ROUTINE Care  11/21/19 10:10 Active


 


 Vital Signs [RC] Q4H Care  11/21/19 10:10 Active


 


 Regular Diet [DIET] Diet  11/21/19 Lunch Active


 


 Acetaminophen/oxyCODONE [Percocet 325-5 MG] Med  11/21/19 10:10 Active





 1 tab PO Q4H PRN   


 


 Acetaminophen/oxyCODONE [Percocet 325-5 MG] Med  11/21/19 10:10 Active





 2 tab PO Q4H PRN   


 


 Belladonna/Opium [B & O Supprettes No. 15A] Med  11/21/19 09:38 Active





 1 supp RECTAL ONETIME PRN   


 


 Ketorolac [Toradol] Med  11/21/19 10:10 Active





 30 mg IVPUSH Q6H PRN   


 


 Morphine Med  11/21/19 10:10 Active





 4 mg IVPUSH Q2H PRN   


 


 Ondansetron [Zofran] Med  11/21/19 10:10 Active





 4 mg IVPUSH Q6H PRN   


 


 Promethazine [Phenergan] Med  11/21/19 10:10 Active





 25 mg IM Q6H PRN   


 


 Peripheral IV Discontinue [OM.PC] Routine Oth  11/21/19 10:10 Ordered


 


 Sequential Compression Device [OM.PC] Per Unit Routine Oth  11/21/19 10:10 

Ordered


 


 Resuscitation Status Routine Resus Stat  11/21/19 10:10 Ordered








 Medication Orders





Belladonna Alkaloids/Opium (B & O Supprettes No. 15a)  1 supp RECTAL ONETIME PRN


   PRN Reason: Pain


   Last Admin: 11/21/19 16:13  Dose: 1 supp


Lactated Ringer's (Ringers, Lactated)  1,000 mls @ 125 mls/hr IV ASDIRECTED [facility]


   Last Admin: 11/21/19 11:36  Dose: 125 mls/hr


   Infusion: 11/21/19 11:36  Dose: 125 mls/hr


   Admin: 11/21/19 07:21  Dose: 125 mls/hr


Ketorolac Tromethamine (Toradol)  30 mg IVPUSH Q6H PRN


   PRN Reason: Pain (severe 7-10)


   Stop: 11/26/19 10:10


   Last Admin: 11/22/19 07:27  Dose: 30 mg


   Admin: 11/21/19 22:08  Dose: 30 mg


   Admin: 11/21/19 15:47  Dose: 30 mg


Morphine Sulfate (Morphine)  4 mg IVPUSH Q2H PRN


   PRN Reason: Pain (severe 7-10)


Ondansetron HCl (Zofran)  4 mg IVPUSH Q6H PRN


   PRN Reason: Nausea/Vomiting


   Last Admin: 11/21/19 15:48  Dose: 4 mg


Oxycodone/Acetaminophen (Percocet 325-5 Mg)  1 tab PO Q4H PRN


   PRN Reason: Pain (moderate 4-6)


Oxycodone/Acetaminophen (Percocet 325-5 Mg)  2 tab PO Q4H PRN


   PRN Reason: Pain (moderate 4-6)


Promethazine HCl (Phenergan)  25 mg IM Q6H PRN


   PRN Reason: Nausea/Vomiting


Scopolamine (Transderm-Scop)  1.5 mg TRDERM Q72H PRN


   PRN Reason: Nausea


   Last Admin: 11/21/19 07:37  Dose: 1.5 mg


Sodium Chloride (Saline Flush)  10 ml FLUSH ASDIRECTED PRN


   PRN Reason: Keep Vein Open


Sodium Chloride (Saline Flush)  2.5 ml FLUSH ASDIRECTED PRN


   PRN Reason: Keep Vein Open


Sodium Chloride (Normal Saline)  10 ml IV ASDIRECTED PRN


   PRN Reason: IV Use











- Assessment


Assessment           (Free Text/Narrative):: 





Status post hysterectomy patient is doing well ambulatory on regular diet no 

problem





- Plan


Plan                        (Free Text/Narrative):: 





Patient to be sent home today the postvasectomy instruction is given to the 

patient the patient given a prescription of Norco 5/325 for postoperative pain 

at home and she have an appointment to come and see me in one week there is no 

restriction on her diet

## 2019-11-22 NOTE — PCM.[email or web address]
**Discharge Summary





- Hospital Course


Diagnosis: Stroke: No





- Discharge Data


Discharge Date: 11/22/19


Discharge Disposition: Home, Self-Care 01


Condition: Good





- Referral to Home Health


Primary Care Physician: 


Ken Meier MD








- Patient Summary/Data


Operative Procedure(s) Performed: rama ALVAREZ sa.  engectomy and Cystoscopy.





- Patient Instructions


Diet: Usual Diet as Tolerated


Activity: As Tolerated


Driving: Do Not Drive


Showering/Bathing: May Shower


Wound/Incision Care: Keep Operative Site/Wound Site Clean and Dry


Notify Provider of: Fever





- Discharge Plan


Home Medications: 


 Home Meds





Calcium Carb, Citrate/Vit D3 [Citracal + D ER] 1 tab PO DAILY 11/18/19 [History]


Desogestrel-Ethinyl Estradiol [Juleber 28 Day Tablet] 1 tab PO DAILY 11/18/19 [

History]


Iron 65 mg PO BID 11/18/19 [History]


Multivit/Iron/Folic Acid/Hb179 [Womens Multivit Hi Potency Tab] 1 tab PO DAILY 

11/18/19 [History]








Patient Handouts:  Acetaminophen; Hydrocodone tablets or capsules, Total 

Laparoscopic Hysterectomy, Care After


Referrals: 


Herb Nroton MD [Physician] - 12/02/19 3:00 pm





- Discharge Summary/Plan Comment


DC Time >30 min.: Yes





- General Info


Date of Service: 11/22/19


Functional Status: Reports: Pain Controlled





- Review of Systems


General: Reports: No Symptoms


HEENT: Reports: No Symptoms


Pulmonary: Reports: No Symptoms


Cardiovascular: Reports: No Symptoms


Gastrointestinal: Reports: No Symptoms


Genitourinary: Reports: No Symptoms


Musculoskeletal: Reports: No Symptoms


Skin: Reports: No Symptoms


Neurological: Reports: No Symptoms


Psychiatric: Reports: No Symptoms





- Patient Data


Vitals - Most Recent: 


 Last Vital Signs











Temp  37.1 C   11/22/19 07:30


 


Pulse  64   11/22/19 07:30


 


Resp  18   11/22/19 07:30


 


BP  101/62   11/22/19 07:30


 


Pulse Ox  99   11/22/19 07:30











Weight - Most Recent: 71.668 kg


I&O - Last 24 hours: 


 Intake & Output











 11/21/19 11/22/19 11/22/19





 22:59 06:59 14:59


 


Intake Total 780 2200 


 


Output Total 400 400 


 


Balance 380 1800 











Lab Results - Last 24 hrs: 


 Laboratory Results - last 24 hr











  11/22/19 11/22/19 Range/Units





  05:37 05:37 


 


WBC  9.29   (4.0-11.0)  K/uL


 


RBC  3.55 L   (4.30-5.90)  M/uL


 


Hgb  10.2 L   (12.0-16.0)  g/dL


 


Hct  30.9 L   (36.0-46.0)  %


 


MCV  87.0   (80.0-98.0)  fL


 


MCH  28.7   (27.0-32.0)  pg


 


MCHC  33.0   (31.0-37.0)  g/dL


 


RDW Std Deviation  40.0   (28.0-62.0)  fl


 


RDW Coeff of Samantha  12   (11.0-15.0)  %


 


Plt Count  270   (150-400)  K/uL


 


MPV  9.40   (7.40-12.00)  fL


 


Neut % (Auto)  61.8   (48.0-80.0)  %


 


Lymph % (Auto)  28.5   (16.0-40.0)  %


 


Mono % (Auto)  8.1   (0.0-15.0)  %


 


Eos % (Auto)  1.2   (0.0-7.0)  %


 


Baso % (Auto)  0.4   (0.0-1.5)  %


 


Neut # (Auto)  5.7   (1.4-5.7)  K/uL


 


Lymph # (Auto)  2.7 H   (0.6-2.4)  K/uL


 


Mono # (Auto)  0.8   (0.0-0.8)  K/uL


 


Eos # (Auto)  0.1   (0.0-0.7)  K/uL


 


Baso # (Auto)  0.0   (0.0-0.1)  K/uL


 


Nucleated RBC %  0.0   /100WBC


 


Nucleated RBCs #  0   K/uL


 


Sodium   139  (136-145)  mmol/L


 


Potassium   3.8  (3.5-5.1)  mmol/L


 


Chloride   106  ()  mmol/L


 


Carbon Dioxide   26.4  (21.0-32.0)  mmol/L


 


BUN   14  (7.0-18.0)  mg/dL


 


Creatinine   0.7  (0.6-1.0)  mg/dL


 


Est Cr Clr Drug Dosing   90.41  mL/min


 


Estimated GFR (MDRD)   > 60.0  ml/min


 


Glucose   89  ()  mg/dL


 


Calcium   8.4 L  (8.5-10.1)  mg/dL











Med Orders - Current: 


 Current Medications





Belladonna Alkaloids/Opium (B & O Supprettes No. 15a)  1 supp RECTAL ONETIME PRN


   PRN Reason: Pain


   Last Admin: 11/21/19 16:13 Dose:  1 supp


Lactated Ringer's (Ringers, Lactated)  1,000 mls @ 125 mls/hr IV ASDIRECTED [facility]


   Last Admin: 11/21/19 11:36 Dose:  125 mls/hr


Ketorolac Tromethamine (Toradol)  30 mg IVPUSH Q6H PRN


   PRN Reason: Pain (severe 7-10)


   Stop: 11/26/19 10:10


   Last Admin: 11/22/19 07:27 Dose:  30 mg


Morphine Sulfate (Morphine)  4 mg IVPUSH Q2H PRN


   PRN Reason: Pain (severe 7-10)


Ondansetron HCl (Zofran)  4 mg IVPUSH Q6H PRN


   PRN Reason: Nausea/Vomiting


   Last Admin: 11/21/19 15:48 Dose:  4 mg


Oxycodone/Acetaminophen (Percocet 325-5 Mg)  1 tab PO Q4H PRN


   PRN Reason: Pain (moderate 4-6)


Oxycodone/Acetaminophen (Percocet 325-5 Mg)  2 tab PO Q4H PRN


   PRN Reason: Pain (moderate 4-6)


Promethazine HCl (Phenergan)  25 mg IM Q6H PRN


   PRN Reason: Nausea/Vomiting


Scopolamine (Transderm-Scop)  1.5 mg TRDERM Q72H PRN


   PRN Reason: Nausea


   Last Admin: 11/21/19 07:37 Dose:  1.5 mg


Sodium Chloride (Saline Flush)  10 ml FLUSH ASDIRECTED PRN


   PRN Reason: Keep Vein Open


Sodium Chloride (Saline Flush)  2.5 ml FLUSH ASDIRECTED PRN


   PRN Reason: Keep Vein Open


Sodium Chloride (Normal Saline)  10 ml IV ASDIRECTED PRN


   PRN Reason: IV Use





Discontinued Medications





Cefazolin Sodium/Dextrose (Ancef) Confirm Administered Dose 2 gm IV .STK-MED ONE


   Stop: 11/21/19 07:24


Dexamethasone (Dexamethasone) Confirm Administered Dose 20 mg .ROUTE .STK-MED 

ONE


   Stop: 11/21/19 08:23


Fentanyl (Sublimaze) Confirm Administered Dose 100 mcg .ROUTE .STK-MED ONE


   Stop: 11/21/19 07:20


Fentanyl (Sublimaze)  50 - 100 mcg IVPUSH Q5M PRN


   PRN Reason: Pain


   Stop: 11/21/19 11:47


   Last Admin: 11/21/19 10:45 Dose:  50 mcg


Fluorescein Sodium (Ak-Fluor) Confirm Administered Dose 5 ml .ROUTE .STK-MED ONE


   Stop: 11/21/19 07:33


Glycopyrrolate (Robinul) Confirm Administered Dose 0.4 mg .ROUTE .STK-MED ONE


   Stop: 11/21/19 09:11


Hydromorphone HCl (Dilaudid) Confirm Administered Dose 2 mg .ROUTE .STK-MED ONE


   Stop: 11/21/19 08:26


Cefazolin Sodium/Dextrose 2 gm (/ Premix)  50 mls @ 100 mls/hr IV ONETIME ONE


   Stop: 11/20/19 09:28


   Last Admin: 11/21/19 11:04 Dose:  Not Given


Ketorolac Tromethamine (Toradol) Confirm Administered Dose 30 mg .ROUTE .STK-

MED ONE


   Stop: 11/21/19 10:04


Ketorolac Tromethamine (Toradol)  30 mg IVPUSH ONETIME ONE


   Stop: 11/21/19 10:11


   Last Admin: 11/21/19 11:19 Dose:  Not Given


Lidocaine (Xylocaine-Mpf 2%) Confirm Administered Dose 5 ml .ROUTE .STK-MED ONE


   Stop: 11/21/19 07:20


Meperidine HCl (Demerol)  12.5 - 25 mg IV ONETIME PRN


   PRN Reason: Shivering


   Stop: 11/21/19 11:46


Metoclopramide HCl (Reglan) Confirm Administered Dose 10 mg .ROUTE .STK-MED ONE


   Stop: 11/21/19 08:23


Midazolam HCl (Versed 1 Mg/Ml) Confirm Administered Dose 2 mg .ROUTE .STK-MED 

ONE


   Stop: 11/21/19 07:20


Neostigmine Methylsulfate (Neostigmine) Confirm Administered Dose 5 mg .ROUTE 

.STK-MED ONE


   Stop: 11/21/19 09:11


Octyl Cyanoacrylate (Dermabond Advance) Confirm Administered Dose 1 applic 

.ROUTE .STK-MED ONE


   Stop: 11/21/19 07:33


Ondansetron HCl (Zofran) Confirm Administered Dose 4 mg .ROUTE .STK-MED ONE


   Stop: 11/21/19 08:23


Promethazine HCl (Phenergan)  12.5 mg IM ONETIME PRN


   PRN Reason: Nausea


Propofol (Diprivan  20 Ml) Confirm Administered Dose 200 mg .ROUTE .STK-MED ONE


   Stop: 11/21/19 07:20


Rocuronium Bromide (Zemuron) Confirm Administered Dose 100 mg .ROUTE .STK-MED 

ONE


   Stop: 11/21/19 07:21


Scopolamine (Transderm-Scop) Confirm Administered Dose 1.5 mg .ROUTE .STK-MED 

ONE


   Stop: 11/21/19 07:34


   Last Admin: 11/21/19 11:04 Dose:  Not Given











- Exam


General: Reports: Alert, Oriented


HEENT: Reports: Pupils Equal, Pupils Reactive, EOMI, Mucous Membr. Moist/Pink


Neck: Reports: Supple


Lungs: Reports: Clear to Auscultation, Normal Respiratory Effort


Cardiovascular: Reports: Regular Rate, Regular Rhythm


GI/Abdominal Exam: Normal Bowel Sounds, Soft, Non-Tender, No Organomegaly, No 

Distention, No Abnormal Bruit, No Mass, Pelvis Stable


 (Female) Exam: Normal External Exam, Normal Speculum Exam, Normal Bimanual 

Exam


Rectal (Female) Exam: Normal Exam, Normal Rectal Tone


Back Exam: Reports: Normal Inspection, Full Range of Motion


Extremities: Normal Inspection, Normal Range of Motion, Non-Tender, No Pedal 

Edema, Normal Capillary Refill


Skin: Reports: Warm, Dry, Intact


Wound/Incisions: Reports: Healing Well


Neurological: Reports: No New Focal Deficit


Psy/Mental Status: Reports: Alert, Normal Affect, Normal Mood